# Patient Record
Sex: MALE | Race: WHITE | NOT HISPANIC OR LATINO | Employment: FULL TIME | URBAN - METROPOLITAN AREA
[De-identification: names, ages, dates, MRNs, and addresses within clinical notes are randomized per-mention and may not be internally consistent; named-entity substitution may affect disease eponyms.]

---

## 2023-06-27 ENCOUNTER — APPOINTMENT (OUTPATIENT)
Dept: GENERAL RADIOLOGY | Facility: CLINIC | Age: 63
DRG: 505 | End: 2023-06-27
Attending: EMERGENCY MEDICINE
Payer: COMMERCIAL

## 2023-06-27 ENCOUNTER — APPOINTMENT (OUTPATIENT)
Dept: CT IMAGING | Facility: CLINIC | Age: 63
DRG: 505 | End: 2023-06-27
Attending: PHYSICIAN ASSISTANT
Payer: COMMERCIAL

## 2023-06-27 ENCOUNTER — HOSPITAL ENCOUNTER (OUTPATIENT)
Facility: CLINIC | Age: 63
Discharge: HOME | DRG: 505 | End: 2023-06-27
Attending: EMERGENCY MEDICINE | Admitting: ORTHOPAEDIC SURGERY
Payer: COMMERCIAL

## 2023-06-27 ENCOUNTER — ANESTHESIA EVENT (OUTPATIENT)
Dept: SURGERY | Facility: CLINIC | Age: 63
DRG: 505 | End: 2023-06-27
Payer: COMMERCIAL

## 2023-06-27 ENCOUNTER — ANESTHESIA (OUTPATIENT)
Dept: SURGERY | Facility: CLINIC | Age: 63
DRG: 505 | End: 2023-06-27
Payer: COMMERCIAL

## 2023-06-27 ENCOUNTER — APPOINTMENT (OUTPATIENT)
Dept: GENERAL RADIOLOGY | Facility: CLINIC | Age: 63
DRG: 505 | End: 2023-06-27
Attending: ORTHOPAEDIC SURGERY
Payer: COMMERCIAL

## 2023-06-27 VITALS
RESPIRATION RATE: 10 BRPM | HEIGHT: 75 IN | SYSTOLIC BLOOD PRESSURE: 147 MMHG | OXYGEN SATURATION: 91 % | WEIGHT: 260 LBS | TEMPERATURE: 97.6 F | BODY MASS INDEX: 32.33 KG/M2 | HEART RATE: 62 BPM | DIASTOLIC BLOOD PRESSURE: 91 MMHG

## 2023-06-27 DIAGNOSIS — S99.921A INJURY OF RIGHT FOOT, INITIAL ENCOUNTER: ICD-10-CM

## 2023-06-27 DIAGNOSIS — S99.922A FOOT INJURY, LEFT, INITIAL ENCOUNTER: Primary | ICD-10-CM

## 2023-06-27 DIAGNOSIS — S92.901A CLOSED FRACTURE OF RIGHT FOOT, INITIAL ENCOUNTER: ICD-10-CM

## 2023-06-27 DIAGNOSIS — T14.8XXA CRUSH INJURY: ICD-10-CM

## 2023-06-27 LAB
ALBUMIN SERPL BCG-MCNC: 4 G/DL (ref 3.5–5.2)
ALP SERPL-CCNC: 81 U/L (ref 40–129)
ALT SERPL W P-5'-P-CCNC: 29 U/L (ref 0–70)
ANION GAP SERPL CALCULATED.3IONS-SCNC: 12 MMOL/L (ref 7–15)
AST SERPL W P-5'-P-CCNC: 23 U/L (ref 0–45)
BASOPHILS # BLD AUTO: 0.1 10E3/UL (ref 0–0.2)
BASOPHILS NFR BLD AUTO: 1 %
BILIRUB SERPL-MCNC: 0.3 MG/DL
BUN SERPL-MCNC: 15.7 MG/DL (ref 8–23)
CALCIUM SERPL-MCNC: 9.2 MG/DL (ref 8.8–10.2)
CHLORIDE SERPL-SCNC: 106 MMOL/L (ref 98–107)
CK SERPL-CCNC: 174 U/L (ref 39–308)
CREAT SERPL-MCNC: 0.95 MG/DL (ref 0.67–1.17)
DEPRECATED HCO3 PLAS-SCNC: 24 MMOL/L (ref 22–29)
EOSINOPHIL # BLD AUTO: 0.2 10E3/UL (ref 0–0.7)
EOSINOPHIL NFR BLD AUTO: 2 %
ERYTHROCYTE [DISTWIDTH] IN BLOOD BY AUTOMATED COUNT: 13.1 % (ref 10–15)
GFR SERPL CREATININE-BSD FRML MDRD: >90 ML/MIN/1.73M2
GLUCOSE SERPL-MCNC: 117 MG/DL (ref 70–99)
HCT VFR BLD AUTO: 40.3 % (ref 40–53)
HGB BLD-MCNC: 13.3 G/DL (ref 13.3–17.7)
IMM GRANULOCYTES # BLD: 0 10E3/UL
IMM GRANULOCYTES NFR BLD: 0 %
LYMPHOCYTES # BLD AUTO: 1.9 10E3/UL (ref 0.8–5.3)
LYMPHOCYTES NFR BLD AUTO: 22 %
MCH RBC QN AUTO: 32.3 PG (ref 26.5–33)
MCHC RBC AUTO-ENTMCNC: 33 G/DL (ref 31.5–36.5)
MCV RBC AUTO: 98 FL (ref 78–100)
MONOCYTES # BLD AUTO: 0.7 10E3/UL (ref 0–1.3)
MONOCYTES NFR BLD AUTO: 7 %
NEUTROPHILS # BLD AUTO: 6 10E3/UL (ref 1.6–8.3)
NEUTROPHILS NFR BLD AUTO: 68 %
NRBC # BLD AUTO: 0 10E3/UL
NRBC BLD AUTO-RTO: 0 /100
PLATELET # BLD AUTO: 284 10E3/UL (ref 150–450)
POTASSIUM SERPL-SCNC: 4 MMOL/L (ref 3.4–5.3)
PROT SERPL-MCNC: 6.6 G/DL (ref 6.4–8.3)
RBC # BLD AUTO: 4.12 10E6/UL (ref 4.4–5.9)
SODIUM SERPL-SCNC: 142 MMOL/L (ref 136–145)
WBC # BLD AUTO: 8.8 10E3/UL (ref 4–11)

## 2023-06-27 PROCEDURE — 0QSM0ZZ REPOSITION LEFT TARSAL, OPEN APPROACH: ICD-10-PCS | Performed by: ORTHOPAEDIC SURGERY

## 2023-06-27 PROCEDURE — 80053 COMPREHEN METABOLIC PANEL: CPT | Performed by: EMERGENCY MEDICINE

## 2023-06-27 PROCEDURE — 250N000011 HC RX IP 250 OP 636: Performed by: NURSE ANESTHETIST, CERTIFIED REGISTERED

## 2023-06-27 PROCEDURE — 82550 ASSAY OF CK (CPK): CPT | Performed by: EMERGENCY MEDICINE

## 2023-06-27 PROCEDURE — 73610 X-RAY EXAM OF ANKLE: CPT | Mod: LT

## 2023-06-27 PROCEDURE — 999N000179 XR SURGERY CARM FLUORO LESS THAN 5 MIN W STILLS

## 2023-06-27 PROCEDURE — 36415 COLL VENOUS BLD VENIPUNCTURE: CPT | Performed by: EMERGENCY MEDICINE

## 2023-06-27 PROCEDURE — 710N000009 HC RECOVERY PHASE 1, LEVEL 1, PER MIN: Performed by: ORTHOPAEDIC SURGERY

## 2023-06-27 PROCEDURE — 370N000017 HC ANESTHESIA TECHNICAL FEE, PER MIN: Performed by: ORTHOPAEDIC SURGERY

## 2023-06-27 PROCEDURE — 250N000011 HC RX IP 250 OP 636: Performed by: ORTHOPAEDIC SURGERY

## 2023-06-27 PROCEDURE — 272N000001 HC OR GENERAL SUPPLY STERILE: Performed by: ORTHOPAEDIC SURGERY

## 2023-06-27 PROCEDURE — 360N000084 HC SURGERY LEVEL 4 W/ FLUORO, PER MIN: Performed by: ORTHOPAEDIC SURGERY

## 2023-06-27 PROCEDURE — 85025 COMPLETE CBC W/AUTO DIFF WBC: CPT | Performed by: EMERGENCY MEDICINE

## 2023-06-27 PROCEDURE — 96376 TX/PRO/DX INJ SAME DRUG ADON: CPT | Mod: 59

## 2023-06-27 PROCEDURE — 999N000141 HC STATISTIC PRE-PROCEDURE NURSING ASSESSMENT: Performed by: ORTHOPAEDIC SURGERY

## 2023-06-27 PROCEDURE — 96374 THER/PROPH/DIAG INJ IV PUSH: CPT | Mod: 59

## 2023-06-27 PROCEDURE — 96361 HYDRATE IV INFUSION ADD-ON: CPT

## 2023-06-27 PROCEDURE — 258N000003 HC RX IP 258 OP 636: Performed by: SURGERY

## 2023-06-27 PROCEDURE — 250N000013 HC RX MED GY IP 250 OP 250 PS 637: Performed by: ORTHOPAEDIC SURGERY

## 2023-06-27 PROCEDURE — 250N000011 HC RX IP 250 OP 636: Performed by: ANESTHESIOLOGY

## 2023-06-27 PROCEDURE — 360N000081 HC SURGERY LEVEL 1 W/ FLUORO, PER MIN: Performed by: ORTHOPAEDIC SURGERY

## 2023-06-27 PROCEDURE — 99285 EMERGENCY DEPT VISIT HI MDM: CPT | Mod: 25

## 2023-06-27 PROCEDURE — 250N000025 HC SEVOFLURANE, PER MIN: Performed by: ORTHOPAEDIC SURGERY

## 2023-06-27 PROCEDURE — 250N000011 HC RX IP 250 OP 636: Mod: JZ | Performed by: EMERGENCY MEDICINE

## 2023-06-27 PROCEDURE — 258N000003 HC RX IP 258 OP 636: Performed by: EMERGENCY MEDICINE

## 2023-06-27 PROCEDURE — 250N000009 HC RX 250: Performed by: NURSE ANESTHETIST, CERTIFIED REGISTERED

## 2023-06-27 PROCEDURE — 710N000012 HC RECOVERY PHASE 2, PER MINUTE: Performed by: ORTHOPAEDIC SURGERY

## 2023-06-27 PROCEDURE — 96375 TX/PRO/DX INJ NEW DRUG ADDON: CPT | Mod: 59

## 2023-06-27 PROCEDURE — 73700 CT LOWER EXTREMITY W/O DYE: CPT | Mod: LT

## 2023-06-27 PROCEDURE — 250N000011 HC RX IP 250 OP 636: Mod: JZ | Performed by: ORTHOPAEDIC SURGERY

## 2023-06-27 PROCEDURE — 258N000003 HC RX IP 258 OP 636: Performed by: NURSE ANESTHETIST, CERTIFIED REGISTERED

## 2023-06-27 PROCEDURE — 73630 X-RAY EXAM OF FOOT: CPT | Mod: LT

## 2023-06-27 RX ORDER — ONDANSETRON 2 MG/ML
4 INJECTION INTRAMUSCULAR; INTRAVENOUS EVERY 30 MIN PRN
Status: DISCONTINUED | OUTPATIENT
Start: 2023-06-27 | End: 2023-06-27 | Stop reason: HOSPADM

## 2023-06-27 RX ORDER — FENTANYL CITRATE 50 UG/ML
INJECTION, SOLUTION INTRAMUSCULAR; INTRAVENOUS PRN
Status: DISCONTINUED | OUTPATIENT
Start: 2023-06-27 | End: 2023-06-27

## 2023-06-27 RX ORDER — SODIUM CHLORIDE, SODIUM LACTATE, POTASSIUM CHLORIDE, CALCIUM CHLORIDE 600; 310; 30; 20 MG/100ML; MG/100ML; MG/100ML; MG/100ML
INJECTION, SOLUTION INTRAVENOUS CONTINUOUS PRN
Status: DISCONTINUED | OUTPATIENT
Start: 2023-06-27 | End: 2023-06-27

## 2023-06-27 RX ORDER — SODIUM CHLORIDE, SODIUM LACTATE, POTASSIUM CHLORIDE, CALCIUM CHLORIDE 600; 310; 30; 20 MG/100ML; MG/100ML; MG/100ML; MG/100ML
INJECTION, SOLUTION INTRAVENOUS CONTINUOUS
Status: DISCONTINUED | OUTPATIENT
Start: 2023-06-27 | End: 2023-06-27 | Stop reason: HOSPADM

## 2023-06-27 RX ORDER — HYDROMORPHONE HCL IN WATER/PF 6 MG/30 ML
0.2 PATIENT CONTROLLED ANALGESIA SYRINGE INTRAVENOUS EVERY 5 MIN PRN
Status: DISCONTINUED | OUTPATIENT
Start: 2023-06-27 | End: 2023-06-27 | Stop reason: HOSPADM

## 2023-06-27 RX ORDER — HYDROMORPHONE HCL IN WATER/PF 6 MG/30 ML
0.4 PATIENT CONTROLLED ANALGESIA SYRINGE INTRAVENOUS EVERY 5 MIN PRN
Status: DISCONTINUED | OUTPATIENT
Start: 2023-06-27 | End: 2023-06-27 | Stop reason: HOSPADM

## 2023-06-27 RX ORDER — MEPERIDINE HYDROCHLORIDE 25 MG/ML
12.5 INJECTION INTRAMUSCULAR; INTRAVENOUS; SUBCUTANEOUS EVERY 5 MIN PRN
Status: DISCONTINUED | OUTPATIENT
Start: 2023-06-27 | End: 2023-06-27 | Stop reason: HOSPADM

## 2023-06-27 RX ORDER — OXYCODONE HYDROCHLORIDE 5 MG/1
5-10 TABLET ORAL EVERY 6 HOURS PRN
Qty: 30 TABLET | Refills: 0 | Status: SHIPPED | OUTPATIENT
Start: 2023-06-27

## 2023-06-27 RX ORDER — LIDOCAINE HYDROCHLORIDE 20 MG/ML
INJECTION, SOLUTION INFILTRATION; PERINEURAL PRN
Status: DISCONTINUED | OUTPATIENT
Start: 2023-06-27 | End: 2023-06-27

## 2023-06-27 RX ORDER — IBUPROFEN 200 MG
200-800 TABLET ORAL EVERY 8 HOURS PRN
Status: ON HOLD | COMMUNITY
End: 2023-06-27

## 2023-06-27 RX ORDER — IBUPROFEN 600 MG/1
600 TABLET, FILM COATED ORAL EVERY 8 HOURS PRN
Qty: 60 TABLET | Refills: 0 | Status: SHIPPED | OUTPATIENT
Start: 2023-06-27

## 2023-06-27 RX ORDER — ONDANSETRON 4 MG/1
4 TABLET, ORALLY DISINTEGRATING ORAL EVERY 30 MIN PRN
Status: DISCONTINUED | OUTPATIENT
Start: 2023-06-27 | End: 2023-06-27 | Stop reason: HOSPADM

## 2023-06-27 RX ORDER — OXYCODONE HYDROCHLORIDE 5 MG/1
10 TABLET ORAL
Status: COMPLETED | OUTPATIENT
Start: 2023-06-27 | End: 2023-06-27

## 2023-06-27 RX ORDER — IBUPROFEN 600 MG/1
600 TABLET, FILM COATED ORAL 3 TIMES DAILY
Status: DISCONTINUED | OUTPATIENT
Start: 2023-06-27 | End: 2023-06-27 | Stop reason: HOSPADM

## 2023-06-27 RX ORDER — CEFAZOLIN SODIUM/WATER 2 G/20 ML
2 SYRINGE (ML) INTRAVENOUS
Status: COMPLETED | OUTPATIENT
Start: 2023-06-27 | End: 2023-06-27

## 2023-06-27 RX ORDER — PROPOFOL 10 MG/ML
INJECTION, EMULSION INTRAVENOUS CONTINUOUS PRN
Status: DISCONTINUED | OUTPATIENT
Start: 2023-06-27 | End: 2023-06-27

## 2023-06-27 RX ORDER — KETOROLAC TROMETHAMINE 30 MG/ML
30 INJECTION, SOLUTION INTRAMUSCULAR; INTRAVENOUS ONCE
Status: COMPLETED | OUTPATIENT
Start: 2023-06-27 | End: 2023-06-27

## 2023-06-27 RX ORDER — PROPOFOL 10 MG/ML
INJECTION, EMULSION INTRAVENOUS PRN
Status: DISCONTINUED | OUTPATIENT
Start: 2023-06-27 | End: 2023-06-27

## 2023-06-27 RX ORDER — FENTANYL CITRATE 50 UG/ML
50 INJECTION, SOLUTION INTRAMUSCULAR; INTRAVENOUS EVERY 5 MIN PRN
Status: DISCONTINUED | OUTPATIENT
Start: 2023-06-27 | End: 2023-06-27 | Stop reason: HOSPADM

## 2023-06-27 RX ORDER — CEFAZOLIN SODIUM/WATER 2 G/20 ML
2 SYRINGE (ML) INTRAVENOUS SEE ADMIN INSTRUCTIONS
Status: DISCONTINUED | OUTPATIENT
Start: 2023-06-27 | End: 2023-06-27 | Stop reason: HOSPADM

## 2023-06-27 RX ORDER — FENTANYL CITRATE 50 UG/ML
25 INJECTION, SOLUTION INTRAMUSCULAR; INTRAVENOUS EVERY 5 MIN PRN
Status: DISCONTINUED | OUTPATIENT
Start: 2023-06-27 | End: 2023-06-27 | Stop reason: HOSPADM

## 2023-06-27 RX ADMIN — HYDROMORPHONE HYDROCHLORIDE 1 MG: 1 INJECTION, SOLUTION INTRAMUSCULAR; INTRAVENOUS; SUBCUTANEOUS at 12:34

## 2023-06-27 RX ADMIN — OXYCODONE HYDROCHLORIDE 10 MG: 5 TABLET ORAL at 18:19

## 2023-06-27 RX ADMIN — LIDOCAINE HYDROCHLORIDE 100 MG: 20 INJECTION, SOLUTION INFILTRATION; PERINEURAL at 16:53

## 2023-06-27 RX ADMIN — PROPOFOL 200 MG: 10 INJECTION, EMULSION INTRAVENOUS at 16:53

## 2023-06-27 RX ADMIN — FENTANYL CITRATE 25 MCG: 50 INJECTION, SOLUTION INTRAMUSCULAR; INTRAVENOUS at 17:43

## 2023-06-27 RX ADMIN — Medication 2 G: at 16:49

## 2023-06-27 RX ADMIN — HYDROMORPHONE HYDROCHLORIDE 0.4 MG: 0.2 INJECTION, SOLUTION INTRAMUSCULAR; INTRAVENOUS; SUBCUTANEOUS at 18:08

## 2023-06-27 RX ADMIN — HYDROMORPHONE HYDROCHLORIDE 0.5 MG: 1 INJECTION, SOLUTION INTRAMUSCULAR; INTRAVENOUS; SUBCUTANEOUS at 17:22

## 2023-06-27 RX ADMIN — SODIUM CHLORIDE 1000 ML: 9 INJECTION, SOLUTION INTRAVENOUS at 11:59

## 2023-06-27 RX ADMIN — MIDAZOLAM 2 MG: 1 INJECTION INTRAMUSCULAR; INTRAVENOUS at 16:49

## 2023-06-27 RX ADMIN — HYDROMORPHONE HYDROCHLORIDE 1 MG: 1 INJECTION, SOLUTION INTRAMUSCULAR; INTRAVENOUS; SUBCUTANEOUS at 12:04

## 2023-06-27 RX ADMIN — ROCURONIUM BROMIDE 30 MG: 50 INJECTION, SOLUTION INTRAVENOUS at 16:53

## 2023-06-27 RX ADMIN — ONDANSETRON 4 MG: 2 INJECTION INTRAMUSCULAR; INTRAVENOUS at 17:43

## 2023-06-27 RX ADMIN — SODIUM CHLORIDE, POTASSIUM CHLORIDE, SODIUM LACTATE AND CALCIUM CHLORIDE: 600; 310; 30; 20 INJECTION, SOLUTION INTRAVENOUS at 16:49

## 2023-06-27 RX ADMIN — FENTANYL CITRATE 25 MCG: 50 INJECTION, SOLUTION INTRAMUSCULAR; INTRAVENOUS at 17:48

## 2023-06-27 RX ADMIN — HYDROMORPHONE HYDROCHLORIDE 1 MG: 1 INJECTION, SOLUTION INTRAMUSCULAR; INTRAVENOUS; SUBCUTANEOUS at 13:04

## 2023-06-27 RX ADMIN — ONDANSETRON 4 MG: 2 INJECTION INTRAMUSCULAR; INTRAVENOUS at 12:02

## 2023-06-27 RX ADMIN — HYDROMORPHONE HYDROCHLORIDE 0.4 MG: 0.2 INJECTION, SOLUTION INTRAMUSCULAR; INTRAVENOUS; SUBCUTANEOUS at 17:53

## 2023-06-27 RX ADMIN — FENTANYL CITRATE 100 MCG: 50 INJECTION, SOLUTION INTRAMUSCULAR; INTRAVENOUS at 16:52

## 2023-06-27 RX ADMIN — KETOROLAC TROMETHAMINE 30 MG: 30 INJECTION, SOLUTION INTRAMUSCULAR; INTRAVENOUS at 18:48

## 2023-06-27 RX ADMIN — SODIUM CHLORIDE, POTASSIUM CHLORIDE, SODIUM LACTATE AND CALCIUM CHLORIDE: 600; 310; 30; 20 INJECTION, SOLUTION INTRAVENOUS at 16:18

## 2023-06-27 RX ADMIN — HYDROMORPHONE HYDROCHLORIDE 1 MG: 1 INJECTION, SOLUTION INTRAMUSCULAR; INTRAVENOUS; SUBCUTANEOUS at 14:06

## 2023-06-27 RX ADMIN — PROPOFOL 30 MCG/KG/MIN: 10 INJECTION, EMULSION INTRAVENOUS at 16:56

## 2023-06-27 ASSESSMENT — ACTIVITIES OF DAILY LIVING (ADL)
ADLS_ACUITY_SCORE: 35

## 2023-06-27 ASSESSMENT — ENCOUNTER SYMPTOMS
SEIZURES: 0
DYSRHYTHMIAS: 0

## 2023-06-27 ASSESSMENT — LIFESTYLE VARIABLES: TOBACCO_USE: 0

## 2023-06-27 NOTE — ED PROVIDER NOTES
"  History     Chief Complaint:  Crush injury        HPI   Dorian Rolle Sr. is a 62 year old male who presents with a left foot injury. The patient states that he was he had 4000 pounds of concrete dropped on his left today about 1.5 hours ago. The concrete was on his foot for 30 seconds. He can still move and feel his toes. Patient did get morphine but that did not help. He has no known bleeding disorders.      Independent Historian:   None - Patient Only    Review of External Notes:   NA       Medications:    The patient is currently on no regular medications.    Past Medical History:    Hyperlipidemia  Polyps of large intestine    Past Surgical History:    Vasectomy     Physical Exam     Patient Vitals for the past 24 hrs:   BP Temp Temp src Pulse Resp SpO2 Height Weight   06/27/23 1137 (!) 142/83 97.3  F (36.3  C) Oral 62 18 97 % 1.905 m (6' 3\") 117.9 kg (260 lb)        Physical Exam  Vitals: reviewed by me  General: Pt seen on [facility], [facility], cooperative, and alert to conversation  Eyes: Tracking well, clear conjunctiva BL  ENT: MMM, midline trachea.   Lungs: No tachypnea, no accessory muscle use. No respiratory distress.   CV: Rate as above  MSK: no joint effusion.  Left foot seems to have sustained a significant sharyn injury.  Sensation is intact to light touch to all 5 toes, though gross deformity is noted, seems to have lost a significant amount of the arch of his foot, no skin breaks, but numerous ecchymotic areas and swelling.  No other trauma noted besides the foot.  Skin: No rash  Neuro: Clear speech and no facial droop.  Psych: Not RIS, no e/o AH/      Emergency Department Course     Imaging:  CT Foot Left w/o Contrast   Final Result   IMPRESSION:   1.  Plantar dislocation of the medial cuneiform at the   navicular-medial cuneiform joint.   2.  Multiple midfoot fractures, including the medial cuneiform, second   metatarsal base, distal aspect lateral cuneiform and third metatarsal   base " as well as the dorsal and proximal medial aspect cuboid.   3.  Displaced oblique fracture distal shaft third metatarsal. Mildly   displaced fractures at the necks of the fourth and fifth metatarsals.   4.  Nondisplaced fracture dorsal base first distal phalanx extending   into the IP joint.   5.  Lateral subluxation of the first metatarsal base of the first TMT   joint with widening and slight offset at the second TMT joint.   6.  No tibiotalar, subtalar, talonavicular or calcaneocuboid   dislocation. No forefoot dislocation.         CB GARCÍA MD            SYSTEM ID:  [id number]      XR Ankle Left G/E 3 Views   Final Result   IMPRESSION: Fractures of the third through fifth metatarsals at the   head neck junction. Degenerative change first MTP joint. The left   ankle is negative for fracture or disruption of ankle mortise. Plantar   calcaneal spurring. Accessory ossicle adjacent to the cuboid..      ALVINO MALONEY MD            SYSTEM ID:  [id number]      Foot XR, G/E 3 views, left   Final Result   IMPRESSION: Fractures of the third through fifth metatarsals at the   head neck junction. Degenerative change first MTP joint. The left   ankle is negative for fracture or disruption of ankle mortise. Plantar   calcaneal spurring. Accessory ossicle adjacent to the cuboid..      ALVINO MALONEY MD            SYSTEM ID:  [id number]         Report per radiology    Laboratory:  Labs Ordered and Resulted from Time of ED Arrival to Time of ED Departure   COMPREHENSIVE METABOLIC PANEL - Abnormal       Result Value    Sodium 142      Potassium 4.0      Chloride 106      Carbon Dioxide (CO2) 24      Anion Gap 12      Urea Nitrogen 15.7      Creatinine 0.95      Calcium 9.2      Glucose 117 (*)     Alkaline Phosphatase 81      AST 23      ALT 29      Protein Total 6.6      Albumin 4.0      Bilirubin Total 0.3      GFR Estimate >90     CBC WITH PLATELETS AND DIFFERENTIAL - Abnormal    WBC Count 8.8      RBC Count 4.12 (*)      Hemoglobin 13.3      Hematocrit 40.3      MCV 98      MCH 32.3      MCHC 33.0      RDW 13.1      Platelet Count 284      % Neutrophils 68      % Lymphocytes 22      % Monocytes 7      % Eosinophils 2      % Basophils 1      % Immature Granulocytes 0      NRBCs per 100 WBC 0      Absolute Neutrophils 6.0      Absolute Lymphocytes 1.9      Absolute Monocytes 0.7      Absolute Eosinophils 0.2      Absolute Basophils 0.1      Absolute Immature Granulocytes 0.0      Absolute NRBCs 0.0     CK TOTAL - Normal            Emergency Department Course & Assessments:    Interventions:  Medications   ondansetron (ZOFRAN) injection 4 mg (4 mg Intravenous $Given 6/27/23 1202)   HYDROmorphone (DILAUDID) injection 1 mg (1 mg Intravenous $Given 6/27/23 1304)   0.9% sodium chloride BOLUS (0 mLs Intravenous Stopped 6/27/23 1307)   HYDROmorphone (DILAUDID) injection 1 mg (1 mg Intravenous $Given 6/27/23 1406)        Assessments:  1150 Obtained the patients history and performed initial exam  1244 Rechecked the patient and updated him on findings    Independent Interpretation (X-rays, CTs, rhythm strip):  Yes I have independently reviewed the foot XR and note several obvious fractures     Consultations/Discussion of Management or Tests:   ED Course as of 06/27/23 1517   Tue Jun 27, 2023   1203 Spoke to Gloria from orthopedics about the patients history   1448 PA from [facility] called and informed me on patients next steps   1502 I spoke to the hospitalist, , who has agreed to admit the patient for continued evaluation and treatment.       Social Determinants of Health affecting care:   None    Disposition:  The patient was admitted to the hospital under the care of Dr. Willis.     Impression & Plan      Medical Decision Making:  This is a very pleasant 62-year-old male who presents emergency room with appears to be a crush injury of his left foot.  He has no other evidence of trauma, and pain control was initially a  concern, but after several doses of Dilaudid he is actually doing better here.  I do not see any evidence of rhabdomyolysis, he is actually neurovascular intact as well, but does seem to have lost a significant amount of the structure of his foot arch.  Orthopedics is involved, and will be taking the patient to the OR tomorrow.  We will plan for careful monitoring, pain control, splinting and admission to the hospitalist team.  Patient himself is okay with this plan and is doing well.      Diagnosis:    ICD-10-CM    1. Foot injury, left, initial encounter  S99.922A Case Request: Open vs closed reduction of multiple midfoot fractures. Possible first and second TMT arthrodeses.     Case Request: Open vs closed reduction of multiple midfoot fractures. Possible first and second TMT arthrodeses.     Case Request: Left foot open vs closed reduction of multiple midfoot fractures, possible first and second tarsometatarsal arthrodeses, OPEN REDUCTION INTERNAL FIXATION, FOOT, left     Case Request: Left foot open vs closed reduction of multiple midfoot fractures, possible first and second tarsometatarsal arthrodeses, OPEN REDUCTION INTERNAL FIXATION, FOOT, left           Scribe Disclosure:  SCOOBY, Bayron Gleason, am serving as a scribe at 11:43 AM on 6/27/2023 to document services personally performed by Krzysztof Epstein MD based on my observations and the provider's statements to me.     6/27/2023   Krzysztof Epstein MD Fitzgerald, Michael Maxwell Kreofsky, MD  06/27/23 1832

## 2023-06-27 NOTE — ANESTHESIA POSTPROCEDURE EVALUATION
Patient: Dorian Rolle Sr.    Procedure: Procedure(s):  open reduction of multiple midfoot fractures. short leg splint application       Anesthesia Type:  General    Note:  Disposition: Inpatient   Postop Pain Control: Uneventful            Sign Out: Well controlled pain   PONV: No   Neuro/Psych: Uneventful            Sign Out: Acceptable/Baseline neuro status   Airway/Respiratory: Uneventful            Sign Out: Acceptable/Baseline resp. status   CV/Hemodynamics: Uneventful            Sign Out: Acceptable CV status; No obvious hypovolemia; No obvious fluid overload   Other NRE: NONE   DID A NON-ROUTINE EVENT OCCUR? No           Last vitals:  Vitals Value Taken Time   /91 06/27/23 1800   Temp 36.3  C (97.3  F) 06/27/23 1727   Pulse 63 06/27/23 1802   Resp 6 06/27/23 1802   SpO2 99 % 06/27/23 1802   Vitals shown include unvalidated device data.    Electronically Signed By: Sekou Harris DO  June 27, 2023  6:03 PM

## 2023-06-27 NOTE — CONSULTS
[facility]    Orthopedics Consultation    Date of Admission:  6/27/2023    Assessment & Plan   Dorian Rolle Sr. is a 62 year old male who was admitted on 6/27/2023. I was asked to see the patient for left foot pain.  He has multiple midfoot and forefoot fractures with a dislocation of the midfoot.  We will bring him back to the operating room for closed reduction and possible percutaneous pinning.  We then placed him into a postoperative splint.    His definitive fixation plan will likely be a medial column arthrodesis of the second and third tarsometatarsal joint arthrodesis.  This was discussed with the patient at length.  I am concerned with his soft tissue swelling currently however and we will bring him back to the operating room to get a better reduction for his foot and thus we will give him approximately 1 to 2 weeks until the swelling subsides.  At that point we will bring him back to the operating room for more definitive fixation.  He will likely be nonweightbearing from this point forward for at least 8 weeks.  They understand this.  We will put him on aspirin 325 daily for DVT prophylaxis.  Following surgery we will see me back in clinic next week to evaluate his swelling.      Jono Salazar MD    Code Status    No Order    Reason for Consult   Reason for consult: Left foot pain    Primary Care Physician   [facility]    History of Present Illness   Dorian Rolle Sr. is a 62 year old male who presents with a chief complaint of left foot pain.  The patient was working today where a 4000 pound slab of cement fell directly onto the left foot.  He was seen in the emergency department was found of multiple fractures and dislocations of the left foot.  There is a substantial dislocation of the medial cuneiform of the left foot.  His pain is substantial on the left side but is tolerable.  He is able to communicate with me without any difficulty.  He  states there is gross swelling.  He previously had a chainsaw injury to the foot.     MEDS:   No current outpatient medications on file.       PAST MEDICAL HISTORY: History reviewed. No pertinent past medical history.    PAST SURGICAL HISTORY:   Past Surgical History:   Procedure Laterality Date     REMOVAL OF SPERM DUCT(S)      Description: Surgery Of Male Genitalia Vasectomy;  Recorded: 12/21/2012;       FAMILY HISTORY: History reviewed. No pertinent family history.    SOCIAL HISTORY:   Social History     Tobacco Use     Smoking status: Never     Smokeless tobacco: Not on file   Substance Use Topics     Alcohol use: Not on file       ALLERGIES:  No Known Allergies    ROS:  10 point ROS neg other than the symptoms noted above in the HPI.    Physical Exam   Temp: 97.3  F (36.3  C) Temp src: Oral BP: (!) 148/95 Pulse: 70   Resp: 19 SpO2: 98 % O2 Device: Nasal cannula Oxygen Delivery: 1 LPM  Vital Signs with Ranges  Temp:  [97.3  F (36.3  C)] 97.3  F (36.3  C)  Pulse:  [62-70] 70  Resp:  [18-19] 19  BP: (142-148)/(83-95) 148/95  SpO2:  [97 %-98 %] 98 %  260 lbs 0 oz    Constitutional: Pleasant, alert, appropriate, following commands.  HEENT: Head atraumatic normocephalic. Pupils equal round and reactive to light.  Respiratory: Unlabored breathing no audible wheeze  Cardiovascular: Regular rate and rhythm  GI: Abdomen soft nontender nondistended.  Lymph/Hematologic: No lymphadenopathy in areas examined  Genitourinary: \ No lugo  Skin: No rashes, no cyanosis, no edema.  Musculoskeletal: Focused examination of the left lower extremity demonstrates gross swelling throughout the foot.  There were some abrasions on the anterior aspect of the foot but no obvious open wounds.  There is a slight divot along the medial column along the first TMT joint.  He is able to flex and extend the toes just slightly.  He has good capillary refill less than 2 seconds.  Neurologic: normal without focal findings, mental status, speech  normal, alert and oriented x iii      Data   Results for orders placed or performed during the hospital encounter of 06/27/23 (from the past 24 hour(s))   CBC with platelets differential    Narrative    The following orders were created for panel order CBC with platelets differential.  Procedure                               Abnormality         Status                     ---------                               -----------         ------                     CBC with platelets and d...[[id number]]  Abnormal            Final result                 Please view results for these tests on the individual orders.   Comprehensive metabolic panel   Result Value Ref Range    Sodium 142 136 - 145 mmol/L    Potassium 4.0 3.4 - 5.3 mmol/L    Chloride 106 98 - 107 mmol/L    Carbon Dioxide (CO2) 24 22 - 29 mmol/L    Anion Gap 12 7 - 15 mmol/L    Urea Nitrogen 15.7 8.0 - 23.0 mg/dL    Creatinine 0.95 0.67 - 1.17 mg/dL    Calcium 9.2 8.8 - 10.2 mg/dL    Glucose 117 (H) 70 - 99 mg/dL    Alkaline Phosphatase 81 40 - 129 U/L    AST 23 0 - 45 U/L    ALT 29 0 - 70 U/L    Protein Total 6.6 6.4 - 8.3 g/dL    Albumin 4.0 3.5 - 5.2 g/dL    Bilirubin Total 0.3 <=1.2 mg/dL    GFR Estimate >90 >60 mL/min/1.73m2   CK total   Result Value Ref Range     39 - 308 U/L   CBC with platelets and differential   Result Value Ref Range    WBC Count 8.8 4.0 - 11.0 10e3/uL    RBC Count 4.12 (L) 4.40 - 5.90 10e6/uL    Hemoglobin 13.3 13.3 - 17.7 g/dL    Hematocrit 40.3 40.0 - 53.0 %    MCV 98 78 - 100 fL    MCH 32.3 26.5 - 33.0 pg    MCHC 33.0 31.5 - 36.5 g/dL    RDW 13.1 10.0 - 15.0 %    Platelet Count 284 150 - 450 10e3/uL    % Neutrophils 68 %    % Lymphocytes 22 %    % Monocytes 7 %    % Eosinophils 2 %    % Basophils 1 %    % Immature Granulocytes 0 %    NRBCs per 100 WBC 0 <1 /100    Absolute Neutrophils 6.0 1.6 - 8.3 10e3/uL    Absolute Lymphocytes 1.9 0.8 - 5.3 10e3/uL    Absolute Monocytes 0.7 0.0 - 1.3 10e3/uL    Absolute Eosinophils 0.2 0.0 -  0.7 10e3/uL    Absolute Basophils 0.1 0.0 - 0.2 10e3/uL    Absolute Immature Granulocytes 0.0 <=0.4 10e3/uL    Absolute NRBCs 0.0 10e3/uL   Foot XR, G/E 3 views, left    Narrative    XR ANKLE LEFT G/E 3 VIEWS, XR FOOT LEFT G/E 3 VIEWS 6/27/2023 12:25 PM      HISTORY: Trauma, pain    COMPARISON: None.         Impression    IMPRESSION: Fractures of the third through fifth metatarsals at the  head neck junction. Degenerative change first MTP joint. The left  ankle is negative for fracture or disruption of ankle mortise. Plantar  calcaneal spurring. Accessory ossicle adjacent to the cuboid..    LAVINO MALONEY MD         SYSTEM ID:  [id number]   XR Ankle Left G/E 3 Views    Narrative    XR ANKLE LEFT G/E 3 VIEWS, XR FOOT LEFT G/E 3 VIEWS 6/27/2023 12:25 PM      HISTORY: Trauma, pain    COMPARISON: None.         Impression    IMPRESSION: Fractures of the third through fifth metatarsals at the  head neck junction. Degenerative change first MTP joint. The left  ankle is negative for fracture or disruption of ankle mortise. Plantar  calcaneal spurring. Accessory ossicle adjacent to the cuboid..    ALVINO MALONEY MD         SYSTEM ID:  [id number]   CT Foot Left w/o Contrast    Narrative    CT FOOT LEFT WITHOUT CONTRAST 6/27/2023 1:59 PM    INDICATION: Left foot midfoot fractures.    COMPARISON: Left foot radiographic exam earlier today.    TECHNIQUE: Noncontrast. Axial, sagittal and coronal thin-section  reconstruction. Dose reduction techniques were used.     CONTRAST: None.    FINDINGS:   Numerous midfoot fractures noted. In addition, there is plantar  dislocation of the medial cuneiform at the navicular-medial cuneiform  articulation. The talonavicular joint is anatomically aligned. No  acute displaced distal tibia or fibula fracture. No talus or calcaneus  fracture identified. Heel spur and distal Achilles insertional  enthesophyte. Small chip fracture dorsal and medial aspect of the  cuboid. Comminuted displaced and  angulated fracture with  intra-articular extension at the second metatarsal base. Comminuted  fracture of the plantar aspect of the medial cuneiform. Tiny fracture  fragments at the distal aspect of the lateral cuneiform and third  metatarsal base. Oblique displaced fracture distal shaft third  metatarsal. Oblique mildly displaced fractures at the necks of the  fourth and fifth metatarsals. Nondisplaced fracture at the dorsal base  of the distal phalanx great toe extending into the great toe IP joint.  No additional toe fracture or toe dislocation identified. Lateral  subluxation of the first metatarsal base at the first TMT joint.  Widening and joint offset at the second TMT joint. Accessory os  peroneum. Degenerative osteoarthritis of the first MTP joint and  metatarsal sesamoid joints.    Medial ankle soft tissue swelling. Some sinus Tarsi fat attenuation  remains. Dorsal foot soft tissue swelling. Intrinsic muscle bulk is  largely preserved.      Impression    IMPRESSION:  1.  Plantar dislocation of the medial cuneiform at the  navicular-medial cuneiform joint.  2.  Multiple midfoot fractures, including the medial cuneiform, second  metatarsal base, distal aspect lateral cuneiform and third metatarsal  base as well as the dorsal and proximal medial aspect cuboid.  3.  Displaced oblique fracture distal shaft third metatarsal. Mildly  displaced fractures at the necks of the fourth and fifth metatarsals.  4.  Nondisplaced fracture dorsal base first distal phalanx extending  into the IP joint.  5.  Lateral subluxation of the first metatarsal base of the first TMT  joint with widening and slight offset at the second TMT joint.  6.  No tibiotalar, subtalar, talonavicular or calcaneocuboid  dislocation. No forefoot dislocation.      CB GARCÍA MD         SYSTEM ID:  [id number]

## 2023-06-27 NOTE — ED NOTES
Bed: ED19  Expected date:   Expected time:   Means of arrival:   Comments:  Liudmila - 888 - 62 M crush injury left foot eta 1132

## 2023-06-27 NOTE — ANESTHESIA PREPROCEDURE EVALUATION
Anesthesia Pre-Procedure Evaluation    Patient: Dorian Rolle .   MRN: [id number] : 1960        Procedure : Procedure(s):  Open vs closed reduction of multiple midfoot fractures. Possible first and second TMT arthrodeses.          History reviewed. No pertinent past medical history.   Past Surgical History:   Procedure Laterality Date     REMOVAL OF SPERM DUCT(S)      Description: Surgery Of Male Genitalia Vasectomy;  Recorded: 2012;      No Known Allergies   Social History     Tobacco Use     Smoking status: Never     Smokeless tobacco: Not on file   Substance Use Topics     Alcohol use: Not on file      Wt Readings from Last 1 Encounters:   23 117.9 kg (260 lb)        Anesthesia Evaluation   Pt has had prior anesthetic. Type: General.    No history of anesthetic complications       ROS/MED HX  ENT/Pulmonary:    (-) tobacco use, asthma and sleep apnea   Neurologic:     (+) no peripheral neuropathy  (-) no seizures and no CVA   Cardiovascular:     (+) Dyslipidemia ----- (-) hypertension, CAD and arrhythmias   METS/Exercise Tolerance:     Hematologic:       Musculoskeletal:   (+) arthritis, fracture, Fractures: L foot.     GI/Hepatic: Comment: Benign GI polyps    (-) GERD   Renal/Genitourinary:    (-) renal disease   Endo:    (-) Type II DM and thyroid disease   Psychiatric/Substance Use:       Infectious Disease:    (-) Recent Fever   Malignancy:       Other:            Physical Exam    Airway        Mallampati: II   TM distance: > 3 FB   Neck ROM: full   Mouth opening: > 3 cm    Respiratory Devices and Support         Dental       (+) Minor Abnormalities - some fillings, tiny chips    B=Bridge, C=Chipped, L=Loose, M=Missing    Cardiovascular   cardiovascular exam normal          Pulmonary   pulmonary exam normal                OUTSIDE LABS:  CBC:   Lab Results   Component Value Date    WBC 8.8 2023    HGB 13.3 2023    HCT 40.3 2023     2023     BMP:   Lab  Results   Component Value Date     06/27/2023    POTASSIUM 4.0 06/27/2023    CHLORIDE 106 06/27/2023    CO2 24 06/27/2023    BUN 15.7 06/27/2023    CR 0.95 06/27/2023     (H) 06/27/2023     COAGS: No results found for: PTT, INR, FIBR  POC: No results found for: BGM, HCG, HCGS  HEPATIC:   Lab Results   Component Value Date    ALBUMIN 4.0 06/27/2023    PROTTOTAL 6.6 06/27/2023    ALT 29 06/27/2023    AST 23 06/27/2023    ALKPHOS 81 06/27/2023    BILITOTAL 0.3 06/27/2023     OTHER:   Lab Results   Component Value Date    MARY 9.2 06/27/2023       Anesthesia Plan    ASA Status:  2   NPO Status:  ELEVATED Aspiration Risk/Unknown    Anesthesia Type: General.     - Airway: ETT   Induction: Intravenous, RSI.   Maintenance: Balanced.        Consents    Anesthesia Plan(s) and associated risks, benefits, and realistic alternatives discussed. Questions answered and patient/representative(s) expressed understanding.    - Discussed:     - Discussed with:  Patient         Postoperative Care    Pain management: IV analgesics, Oral pain medications.   PONV prophylaxis: Ondansetron (or other 5HT-3), Dexamethasone or Solumedrol, Background Propofol Infusion     Comments:                Shoaib Mendoza MD

## 2023-06-27 NOTE — DISCHARGE INSTRUCTIONS
Today you received Toradol, an antiinflammatory medication similar to Ibuprofen.  You should not take other antiinflammatory medication, such as Ibuprofen, Motrin, Advil, Aleve, Naprosyn, etc until 12:45 am.    Same Day Surgery Discharge Instructions for  Sedation and General Anesthesia     It's not unusual to feel dizzy, light-headed or faint for up to 24 hours after surgery or while taking pain medication.  If you have these symptoms: sit for a few minutes before standing and have someone assist you when you get up to walk or use the bathroom.    You should rest and relax for the next 24 hours. We recommend you make arrangements to have an adult stay with you for at least 24 hours after your discharge.  Avoid hazardous and strenuous activity.    DO NOT DRIVE any vehicle or operate mechanical equipment for 24 hours following the end of your surgery.  Even though you may feel normal, your reactions may be affected by the medication you have received.    Do not drink alcoholic beverages for 24 hours following surgery.     Slowly progress to your regular diet as you feel able. It's not unusual to feel nauseated and/or vomit after receiving anesthesia.  If you develop these symptoms, drink clear liquids (apple juice, ginger ale, broth, 7-up, etc. ) until you feel better.  If your nausea and vomiting persists for 24 hours, please notify your surgeon.      All narcotic pain medications, along with inactivity and anesthesia, can cause constipation. Drinking plenty of liquids and increasing fiber intake will help.    For any questions of a medical nature, call your surgeon.    Do not make important decisions for 24 hours.    If you had general anesthesia, you may have a sore throat for a couple of days related to the breathing tube used during surgery.  You may use Cepacol lozenges to help with this discomfort.  If it worsens or if you develop a fever, contact your surgeon.     If you feel your pain is not well managed  with the pain medications prescribed by your surgeon, please contact your surgeon's office to let them know so they can address your concerns.      **If you have questions or concerns about your procedure,  call Dr. Salazar at [phone]**     POST-OPERATIVE INSTRUCTIONS  FOOT AND ANKLE SURGERY      These precautions MUST be followed for the first 24 hours after surgery:  Upon discharge, go directly home.  You must make arrangements to have a responsible adult stay with you.  DO NOT DRINK ALCOHOLIC BEVERAGES  It is not unusual to feel lightheaded up to 24 hours after surgery or while taking pain medication.  If you feel lightheaded, sit for a few minutes before standing and have someone assisted you when you get up to walk or use the restroom.  Do not use any mechanical equipment.  Do not make any important or legal decisions for 24 hours or while on pain medications.  You may experience dry mouth, sore throat, or sleep disturbances from the anesthesia and medications used during surgery.  Generalized muscle aches can sometimes occur.  These usually disappear in 12-24 hours.    POST-OPERATIVE CARE GUIDELINES    The following are general guidelines about what to expect the first days/weeks after surgery.  They are not specific, and your recovery may be slightly different.    Blood clots are not common, but are emergencies.  If you have sudden onset pain in your calf or leg, or have sudden shortness of breath, go to the Emergency Department.    Elevation of your operative foot/ankle is key to reducing the swelling in the immediate post-operative phase (first 3-5 days).  When you are at rest, elevate your foot at or above the level of your heart.  When sitting, your foot should be elevated on a chair or stool; not hanging down.    You should plan to rest the day after surgery no matter how minor the procedure.  More complicated procedures will require more time to return to normal activity.    Foot and ankle surgery  is painful in most cases.   It is not unusual for the pain to be worse a day or two after surgery than on the day of.  If your pain is more than 8/10 contact our office.  If you don t have pain, gradually decrease your pain meds by substituting Tylenol.      All pain medications, along with inactivity can cause constipation.  Use the Senakot as directed, increase fluid intake to 1 quart per day and increase your dietary fiber.  (The  P  fruits - peaches, plums, pears, and prunes as well as anise/black licorice are recommended.)  It is not unusual to run a low-grade fever after surgery.  If your temperature is elevated above 102 , lasts longer than 24 hours, or is questionable in any way, contact our office.    Drainage from your cast/dressing is normal.  Reinforce your cast/dressing with 4x4 dressings and cover with an Ace wrap.  Wait until 24 hours after surgery to change your dressings; by this time most of your bleeding will have stopped.  If you have drainage through your dressings 2 days after surgery, contact our office.    You cast/dressing will be changed at your 1-week follow up appointment.  They should be kept clean and DRY.  If your cast/dressing is damaged before that, contact our office.    It is normal to experience some bruising in the toes after surgery and they may appear  dark  when your foot hangs down.  It is important to actively wiggle your toes for at least 5-10 minutes each hour UNLESS you had surgery on those toes.    Use ice on your foot/ankle over the dressing/cast for 20 minutes per hour, 10 or more times per day.  A large bag of frozen peas works well.    Bathing: take a tub or sponge bath instead of a shower if possible during the first two weeks.  If you choose to shower, cover the dressing/cast with a waterproof covering, these may be ordered from www.se[employer].com or are available at some pharmacies/medical supply stores.  Another option is XeroSox, which is the original vacuum  sealed bandage and cast cover.  The cast cover has a vacuum seal and is absolutely waterproof.  1-238.964.6596 or www.[employer] for more information.    Driving:  For surgery on the left foot/ankle, you may drive as soon as narcotic medications are stopped.  For surgery on the right foot/ankle, you may drive when you are out of a cast, off pain medications, and you feel secure with braking.    In general, listen to your foot/ankle.  If you have a sudden, dramatic increase in pain that does not resolve after an hour or so, something is wrong - call our office.  If you fall or bump your foot/ankle and have sudden pain that resolves, give it 24 hours before you call.      Start daily Aspirin 325mg dose starting tomorrow.       If you have any further questions or concerns, please call our office at [phone]

## 2023-06-27 NOTE — OP NOTE
[facility]   Operative Note    Pre-operative diagnosis: 1.  Left foot crush injury  2.  Left midfoot dislocation with plantar dislocation of the medial cuneiform   3.  Multiple metatarsal fractures  4.  Lisfranc injury left foot   Post-operative diagnosis Same   Procedure: Procedure(s):  open reduction of multiple midfoot fractures. short leg splint application   Surgeon(s): Surgeon(s) and Role:     * Jono Salazar MD - Primary   Estimated blood loss: 5 mL    Specimens: * No specimens in log *   Findings: Midfoot fracture dislocation     Indications: This is a 62-year-old male who unfortunately had a 4000 pound cement slab landed directly on his left foot.  He was seen in the emergency department and was found to have multiple midfoot fractures and a dislocation of the midfoot.  I had a long discussion with the patient and his family regarding our options at this point.  His foot is too swollen for any sort of surgical intervention at this point and thus it would be ideal if we can get his foot out to length.  His medial column is substantially shortened due to the dislocation of the midfoot.  I think it would be best to bring him back to the operating room for closed versus open reduction and possible percutaneous pinning of the foot in order to reduce the fractures and to allow his foot some time to improve from a soft tissue swelling standpoint.  They understand this and have elected to move forward with surgical intervention.  We did discuss the risks of surgery.     Description of procedure:   The patient was met in the preoperative area and the operative site, the left foot, signed by myself.  Preoperative antibiotics were given.  The patient is then brought back to the operating room and underwent general anesthesia.  He was then placed on the operating table and all bony prominences were padded.  He was then prepped and draped in normal sterile fashion.  A timeout was then  called ensuring we are operating the correct site and the correct patient.  All staff concerns were addressed this time.  Following the timeout a closed reduction attempt was made but the fracture was unable to be reduced and thus a small incision was made directly over the naviculocuneiform joint.  A Center was entered into the joint and the medial cuneiform was carefully levered off of the plantar surface and thus it reduced nicely.  I did not need to percutaneously pin this to hold it in place.  We brought in C arm and took x-rays confirming our reduction.  We then placed 1 single nylon stitch in the small incision and play sterile bandages on the patient.  He was then placed into a short leg splint and was transferred off the operating table.  This was a plaster splint.    Postoperative plan: The patient be discharged today with very careful and close instructions to follow-up in the ER if his pain is uncontrolled per.  There is certainly some concern with compartment syndrome for the amount of trauma that he experienced.  The patient and his family have my cell phone number and are able to contact me immediately if they have any symptoms suggestive of compartment syndrome.  He will be on aspirin 325 daily for DVT prophylaxis.  He will be seen in my clinic in approximately 1 week to check for swelling.  He should elevate and ice aggressively.

## 2023-06-27 NOTE — ED TRIAGE NOTES
Pt reports having a 4000 lb piece of concrete fall onto his L foot for roughly 30 seconds.      Triage Assessment     Row Name 06/27/23 1147       Triage Assessment (Adult)    Airway WDL WDL       Respiratory WDL    Respiratory WDL WDL       Skin Circulation/Temperature WDL    Skin Circulation/Temperature WDL X  abrasions to L foot and L shin       Cardiac WDL    Cardiac WDL WDL       Peripheral/Neurovascular WDL    Peripheral Neurovascular WDL WDL       Cognitive/Neuro/Behavioral WDL    Cognitive/Neuro/Behavioral WDL WDL

## 2023-06-27 NOTE — ANESTHESIA PROCEDURE NOTES
Airway       Patient location during procedure: OR       Procedure Start/Stop Times: 6/27/2023 4:54 PM  Staff -        Anesthesiologist:  Sekou Harris DO       CRNA: Claudia Hickman APRN CRNA       Other Anesthesia Staff: Ramila Martinez       Performed By: SRNA  Consent for Airway        Urgency: elective  Indications and Patient Condition       Indications for airway management: sruthi-procedural       Induction type:intravenous       Mask difficulty assessment: 1 - vent by mask    Final Airway Details       Final airway type: endotracheal airway       Successful airway: ETT - single  Endotracheal Airway Details        ETT size (mm): 8.0       Cuffed: yes       Successful intubation technique: direct laryngoscopy       DL Blade Type: [place] 2       Grade View of Cords: 2       Adjucts: stylet       Position: Right       Measured from: lips       Secured at (cm): 24       Bite block used: None    Post intubation assessment        Placement verified by: capnometry, equal breath sounds and chest rise        Number of attempts at approach: 1       Number of other approaches attempted: 0       Secured with: pink tape       Ease of procedure: easy       Dentition: Intact and Unchanged    Medication(s) Administered   Medication Administration Time: 6/27/2023 4:54 PM

## 2023-06-27 NOTE — PHARMACY-ADMISSION MEDICATION HISTORY
Pharmacist Admission Medication History    Admission medication history is complete. The information provided in this note is only as accurate as the sources available at the time of the update.    Medication reconciliation/reorder completed by provider prior to medication history? No    Information Source(s): Patient via in-person    Pertinent Information: He reports taking no regular medications    Changes made to PTA medication list:    Added: ibuprofen prn    Deleted: None    Changed: None      Allergies reviewed with patient and updates made in EHR: yes    Medication History Completed By: Daly Funes [facility] 6/27/2023 3:24 PM    Prior to Admission medications    Medication Sig Last Dose Taking? Auth Provider Long Term End Date   ibuprofen (ADVIL/MOTRIN) 200 MG tablet Take 200-800 mg by mouth every 8 hours as needed for pain Past Week Yes Unknown, Entered By History

## 2023-06-27 NOTE — ANESTHESIA CARE TRANSFER NOTE
Patient: Dorian Rolle .    Procedure: Procedure(s):  open reduction of multiple midfoot fractures. short leg splint application       Diagnosis: Foot injury, left, initial encounter [S94.921J]  Diagnosis Additional Information: No value filed.    Anesthesia Type:   General     Note:    Oropharynx: oropharynx clear of all foreign objects  Level of Consciousness: awake  Oxygen Supplementation: face mask  Level of Supplemental Oxygen (L/min / FiO2): 6  Independent Airway: airway patency satisfactory and stable  Dentition: dentition unchanged  Vital Signs Stable: post-procedure vital signs reviewed and stable  Report to RN Given: handoff report given  Patient transferred to: PACU    Handoff Report: Identifed the Patient, Identified the Reponsible Provider, Reviewed the pertinent medical history, Discussed the surgical course, Reviewed Intra-OP anesthesia mangement and issues during anesthesia, Set expectations for post-procedure period and Allowed opportunity for questions and acknowledgement of understanding      Vitals:  Vitals Value Taken Time   BP     Temp     Pulse     Resp     SpO2         Electronically Signed By: NEGRITO Nash CRNA  June 27, 2023  5:23 PM

## 2023-06-27 NOTE — ED NOTES
[facility]  ED Nurse Handoff Report    ED Chief complaint: Crush injury       ED Diagnosis:   Final diagnoses:   Injury of right foot, initial encounter   Closed fracture of right foot, initial encounter   Crush injury       Code Status: not addressed at this time    Allergies: No Known Allergies    Patient Story: Pt reports having a 4000 lb piece of concrete fall onto his L foot for roughly 30 seconds.   Focused Assessment:  Pt A&Ox4, c/o severe pain in foot, see MAR. Placed on 1L nasal cannula when sleeping after pain meds d/t dipping into the 80's on RA. Has not ambulated yet. VSS otherwise stable.   Labs Ordered and Resulted from Time of ED Arrival to Time of ED Departure   COMPREHENSIVE METABOLIC PANEL - Abnormal       Result Value    Sodium 142      Potassium 4.0      Chloride 106      Carbon Dioxide (CO2) 24      Anion Gap 12      Urea Nitrogen 15.7      Creatinine 0.95      Calcium 9.2      Glucose 117 (*)     Alkaline Phosphatase 81      AST 23      ALT 29      Protein Total 6.6      Albumin 4.0      Bilirubin Total 0.3      GFR Estimate >90     CBC WITH PLATELETS AND DIFFERENTIAL - Abnormal    WBC Count 8.8      RBC Count 4.12 (*)     Hemoglobin 13.3      Hematocrit 40.3      MCV 98      MCH 32.3      MCHC 33.0      RDW 13.1      Platelet Count 284      % Neutrophils 68      % Lymphocytes 22      % Monocytes 7      % Eosinophils 2      % Basophils 1      % Immature Granulocytes 0      NRBCs per 100 WBC 0      Absolute Neutrophils 6.0      Absolute Lymphocytes 1.9      Absolute Monocytes 0.7      Absolute Eosinophils 0.2      Absolute Basophils 0.1      Absolute Immature Granulocytes 0.0      Absolute NRBCs 0.0     CK TOTAL - Normal           CT Foot Left w/o Contrast   Final Result   IMPRESSION:   1.  Plantar dislocation of the medial cuneiform at the   navicular-medial cuneiform joint.   2.  Multiple midfoot fractures, including the medial cuneiform, second   metatarsal base, distal  aspect lateral cuneiform and third metatarsal   base as well as the dorsal and proximal medial aspect cuboid.   3.  Displaced oblique fracture distal shaft third metatarsal. Mildly   displaced fractures at the necks of the fourth and fifth metatarsals.   4.  Nondisplaced fracture dorsal base first distal phalanx extending   into the IP joint.   5.  Lateral subluxation of the first metatarsal base of the first TMT   joint with widening and slight offset at the second TMT joint.   6.  No tibiotalar, subtalar, talonavicular or calcaneocuboid   dislocation. No forefoot dislocation.         CB GARCÍA MD            SYSTEM ID:  [id number]      XR Ankle Left G/E 3 Views   Final Result   IMPRESSION: Fractures of the third through fifth metatarsals at the   head neck junction. Degenerative change first MTP joint. The left   ankle is negative for fracture or disruption of ankle mortise. Plantar   calcaneal spurring. Accessory ossicle adjacent to the cuboid..      ALVINO MALONEY MD            SYSTEM ID:  [id number]      Foot XR, G/E 3 views, left   Final Result   IMPRESSION: Fractures of the third through fifth metatarsals at the   head neck junction. Degenerative change first MTP joint. The left   ankle is negative for fracture or disruption of ankle mortise. Plantar   calcaneal spurring. Accessory ossicle adjacent to the cuboid..      ALVINO MALONEY MD            SYSTEM ID:  [id number]          Treatments and/or interventions provided: see MAR  Patient's response to treatments and/or interventions: decrease in pain    To be done/followed up on inpatient unit:  cont with admitting Md orders    Does this patient have any cognitive concerns?: none    Activity level - Baseline/Home:  Independent  Activity Level - Current:   Stand with assist x2    Patient's Preferred language: English   Needed?: No    Isolation: None  Infection: Not Applicable  Patient tested for COVID 19 prior to admission: NO  Bariatric?:  "No    Vital Signs:   Vitals:    06/27/23 1137   BP: (!) 142/83   Pulse: 62   Resp: 18   Temp: 97.3  F (36.3  C)   TempSrc: Oral   SpO2: 97%   Weight: 117.9 kg (260 lb)   Height: 1.905 m (6' 3\")       Cardiac Rhythm:     Was the PSS-3 completed:  yes  What interventions are required if any?               Family Comments: family at bedside      For the majority of the shift this patient's behavior was Green.   Behavioral interventions performed were none.    ED NURSE PHONE NUMBER: [phone]       "

## 2023-06-29 NOTE — DISCHARGE SUMMARY
[facility]    Discharge Summary  Orthopedics    Date of Admission:  6/27/2023  Date of Discharge:  6/27/2023  7:14 PM  Discharging Provider: Jono Salazar MD, MD    Discharge Diagnoses   Patient Active Problem List   Diagnosis     Changed Sexual Interest (Libido): Decreased     Hyperlipidemia     Benign Polyps Of The Large Intestine     Crush injury     Closed fracture of right foot, initial encounter     Injury of right foot, initial encounter     Foot injury, left, initial encounter       Procedure/Surgery Information   Procedure: Procedure(s):  open reduction of multiple midfoot fractures. short leg splint application   Surgeon(s): Surgeon(s) and Role:     * Jono Salazar MD - Primary   Specimens: * No specimens in log *   Non-operative procedures None performed     History of Present Illness   Dorian Rolle Sr. is a 62 year old male who presented with crush injury. Underwent closed reduction and splinting. And eventual discharge    Hospital Course   Dorian Rolle Sr. was admitted on 6/27/2023.  The following problems were addressed during his hospitalization:  Left foot crushy injury    Post-operative pain control: included oxycodone and will be Percocet on discharge.     Medications discontinued or adjusted during this hospitalization: No change     Antibiotics prescribed at discharge: None prescribed     Imaging study follow up needs:   -No studies require specific follow-up      Jono Salazar MD    Discharge Disposition   Discharged to home   Condition at discharge: Stable    Pending Results   Final pathology results: No pathology submitted    Unresulted Labs Ordered in the Past 30 Days of this Admission     No orders found from 5/28/2023 to 6/28/2023.          Primary Care Physician   [facility]        Consultations This Hospital Stay   None    Time Spent on this Encounter   I have spent less than 30 minutes on this  discharge.    Discharge Orders      Discharge Instructions    Review discharge instructions as directed by Provider.     Elevate affected extremity     No weight bearing     Crutches DME    DME Documentation: Describe the reason for need to support medical necessity: Impaired gait due to Foot/Ankle surgery. Anticipated length of need: 3 months     Rolling Knee Walker DME    DME Documentation: Describe the reason for need to support medical necessity: Impaired gait due to Foot/Ankle surgery. Anticipated length of need: 3 months     Walker DME    DME Documentation: Describe the reason for need to support medical necessity: Impaired gait due to Foot/Ankle surgery. Anticipated length of need: 3 months     Discharge Medications   Discharge Medication List as of 6/27/2023  6:51 PM      START taking these medications    Details   oxyCODONE (ROXICODONE) 5 MG tablet Take 1-2 tablets (5-10 mg) by mouth every 6 hours as needed for severe pain, Disp-30 tablet, R-0, Local Print         CONTINUE these medications which have CHANGED    Details   ibuprofen (ADVIL/MOTRIN) 600 MG tablet Take 1 tablet (600 mg) by mouth every 8 hours as needed for other (mild and/or inflammatory pain), Disp-60 tablet, R-0, E-Prescribe           Allergies   No Known Allergies  Data   Results for orders placed or performed during the hospital encounter of 06/27/23   Foot XR, G/E 3 views, left    Narrative    XR ANKLE LEFT G/E 3 VIEWS, XR FOOT LEFT G/E 3 VIEWS 6/27/2023 12:25 PM      HISTORY: Trauma, pain    COMPARISON: None.         Impression    IMPRESSION: Fractures of the third through fifth metatarsals at the  head neck junction. Degenerative change first MTP joint. The left  ankle is negative for fracture or disruption of ankle mortise. Plantar  calcaneal spurring. Accessory ossicle adjacent to the cuboid..    ALVINO MALONEY MD         SYSTEM ID:  [id number]   XR Ankle Left G/E 3 Views    Narrative    XR ANKLE LEFT G/E 3 VIEWS, XR FOOT LEFT G/E 3  VIEWS 6/27/2023 12:25 PM      HISTORY: Trauma, pain    COMPARISON: None.         Impression    IMPRESSION: Fractures of the third through fifth metatarsals at the  head neck junction. Degenerative change first MTP joint. The left  ankle is negative for fracture or disruption of ankle mortise. Plantar  calcaneal spurring. Accessory ossicle adjacent to the cuboid..    ALVINO MALONEY MD         SYSTEM ID:  [id number]   CT Foot Left w/o Contrast    Narrative    CT FOOT LEFT WITHOUT CONTRAST 6/27/2023 1:59 PM    INDICATION: Left foot midfoot fractures.    COMPARISON: Left foot radiographic exam earlier today.    TECHNIQUE: Noncontrast. Axial, sagittal and coronal thin-section  reconstruction. Dose reduction techniques were used.     CONTRAST: None.    FINDINGS:   Numerous midfoot fractures noted. In addition, there is plantar  dislocation of the medial cuneiform at the navicular-medial cuneiform  articulation. The talonavicular joint is anatomically aligned. No  acute displaced distal tibia or fibula fracture. No talus or calcaneus  fracture identified. Heel spur and distal Achilles insertional  enthesophyte. Small chip fracture dorsal and medial aspect of the  cuboid. Comminuted displaced and angulated fracture with  intra-articular extension at the second metatarsal base. Comminuted  fracture of the plantar aspect of the medial cuneiform. Tiny fracture  fragments at the distal aspect of the lateral cuneiform and third  metatarsal base. Oblique displaced fracture distal shaft third  metatarsal. Oblique mildly displaced fractures at the necks of the  fourth and fifth metatarsals. Nondisplaced fracture at the dorsal base  of the distal phalanx great toe extending into the great toe IP joint.  No additional toe fracture or toe dislocation identified. Lateral  subluxation of the first metatarsal base at the first TMT joint.  Widening and joint offset at the second TMT joint. Accessory os  peroneum. 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    Medial ankle soft tissue swelling. Some sinus Tarsi fat attenuation  remains. Dorsal foot soft tissue swelling. Intrinsic muscle bulk is  largely preserved.      Impression    IMPRESSION:  1.  Plantar dislocation of the medial cuneiform at the  navicular-medial cuneiform joint.  2.  Multiple midfoot fractures, including the medial cuneiform, second  metatarsal base, distal aspect lateral cuneiform and third metatarsal  base as well as the dorsal and proximal medial aspect cuboid.  3.  Displaced oblique fracture distal shaft third metatarsal. Mildly  displaced fractures at the necks of the fourth and fifth metatarsals.  4.  Nondisplaced fracture dorsal base first distal phalanx extending  into the IP joint.  5.  Lateral subluxation of the first metatarsal base of the first TMT  joint with widening and slight offset at the second TMT joint.  6.  No tibiotalar, subtalar, talonavicular or calcaneocuboid  dislocation. No forefoot dislocation.      CB GARCÍA MD         SYSTEM ID:  [id number]   XR Surgery JAMIE L/T 5 Min Fluoro w Stills    Narrative    EXAM: XR SURGERY JAMIE FLUORO LESS THAN 5 MIN W STILLS  LOCATION: [facility]  DATE: 6/27/2023    INDICATION: Reduction of multiple left midfoot fractures  COMPARISON: Radiographs and CT 06/27/2023.    TECHNIQUE: Intraoperative fluoroscopy performed during the patient's procedure.    FLUOROSCOPIC TIME: 0.2 minutes.  NUMBER OF IMAGES: 2    FINDINGS: Images demonstrate attempted reduction of a homolateral Lisfranc fracture-dislocation involving multiple bones of the midfoot, as well as a mildly displaced fractures of the third, fourth, and fifth metatarsal shafts. Findings are better   appreciated on recent CT imaging.

## (undated) DEVICE — SU ETHILON 3-0 PS-1 18" 1663G

## (undated) DEVICE — CAST PADDING 6" UNSTERILE 9046

## (undated) DEVICE — DRAPE COVER C-ARM SEAMLESS SNAP-KAP 03-KP26 LATEX FREE

## (undated) DEVICE — BNDG ELASTIC 6" DBL LENGTH UNSTERILE 6611-16

## (undated) DEVICE — LINEN TOWEL PACK X5 5464

## (undated) DEVICE — CAST PLASTER SPLINT 5X30" 7395

## (undated) DEVICE — SOL NACL 0.9% IRRIG 1000ML BOTTLE 07138-09

## (undated) DEVICE — PACK EXTREMITY SOP15EXFSD

## (undated) RX ORDER — HYDROMORPHONE HCL IN WATER/PF 6 MG/30 ML
PATIENT CONTROLLED ANALGESIA SYRINGE INTRAVENOUS
Status: DISPENSED
Start: 2023-06-27

## (undated) RX ORDER — HYDROMORPHONE HYDROCHLORIDE 1 MG/ML
INJECTION, SOLUTION INTRAMUSCULAR; INTRAVENOUS; SUBCUTANEOUS
Status: DISPENSED
Start: 2023-06-27

## (undated) RX ORDER — OXYCODONE HYDROCHLORIDE 5 MG/1
TABLET ORAL
Status: DISPENSED
Start: 2023-06-27

## (undated) RX ORDER — ONDANSETRON 2 MG/ML
INJECTION INTRAMUSCULAR; INTRAVENOUS
Status: DISPENSED
Start: 2023-06-27

## (undated) RX ORDER — KETOROLAC TROMETHAMINE 30 MG/ML
INJECTION, SOLUTION INTRAMUSCULAR; INTRAVENOUS
Status: DISPENSED
Start: 2023-06-27

## (undated) RX ORDER — PROPOFOL 10 MG/ML
INJECTION, EMULSION INTRAVENOUS
Status: DISPENSED
Start: 2023-06-27

## (undated) RX ORDER — FENTANYL CITRATE 50 UG/ML
INJECTION, SOLUTION INTRAMUSCULAR; INTRAVENOUS
Status: DISPENSED
Start: 2023-06-27

## (undated) RX ORDER — FENTANYL CITRATE 0.05 MG/ML
INJECTION, SOLUTION INTRAMUSCULAR; INTRAVENOUS
Status: DISPENSED
Start: 2023-06-27

## (undated) RX ORDER — BUPIVACAINE HYDROCHLORIDE 2.5 MG/ML
INJECTION, SOLUTION EPIDURAL; INFILTRATION; INTRACAUDAL
Status: DISPENSED
Start: 2023-06-27